# Patient Record
Sex: MALE | Race: WHITE | Employment: OTHER | URBAN - METROPOLITAN AREA
[De-identification: names, ages, dates, MRNs, and addresses within clinical notes are randomized per-mention and may not be internally consistent; named-entity substitution may affect disease eponyms.]

---

## 2019-05-13 ENCOUNTER — HOSPITAL ENCOUNTER (INPATIENT)
Age: 64
LOS: 2 days | Discharge: HOME | DRG: 871 | End: 2019-05-15
Attending: EMERGENCY MEDICINE | Admitting: INTERNAL MEDICINE
Payer: MEDICARE

## 2019-05-13 ENCOUNTER — APPOINTMENT (OUTPATIENT)
Dept: CT IMAGING | Age: 64
DRG: 871 | End: 2019-05-13
Payer: MEDICARE

## 2019-05-13 DIAGNOSIS — A41.9 SEPTICEMIA (HCC): Primary | ICD-10-CM

## 2019-05-13 DIAGNOSIS — J18.9 PNEUMONIA DUE TO ORGANISM: ICD-10-CM

## 2019-05-13 LAB
ANION GAP SERPL CALCULATED.3IONS-SCNC: 15 MMOL/L (ref 7–16)
BASOPHILS ABSOLUTE: 0.09 E9/L (ref 0–0.2)
BASOPHILS RELATIVE PERCENT: 0.4 % (ref 0–2)
BUN BLDV-MCNC: 16 MG/DL (ref 8–23)
CALCIUM SERPL-MCNC: 9.1 MG/DL (ref 8.6–10.2)
CHLORIDE BLD-SCNC: 103 MMOL/L (ref 98–107)
CO2: 29 MMOL/L (ref 22–29)
CREAT SERPL-MCNC: 1.4 MG/DL (ref 0.7–1.2)
D DIMER: 1370 NG/ML DDU
EKG ATRIAL RATE: 75 BPM
EKG P AXIS: 69 DEGREES
EKG P-R INTERVAL: 128 MS
EKG Q-T INTERVAL: 418 MS
EKG QRS DURATION: 86 MS
EKG QTC CALCULATION (BAZETT): 466 MS
EKG R AXIS: -28 DEGREES
EKG T AXIS: 63 DEGREES
EKG VENTRICULAR RATE: 75 BPM
EOSINOPHILS ABSOLUTE: 0.18 E9/L (ref 0.05–0.5)
EOSINOPHILS RELATIVE PERCENT: 0.8 % (ref 0–6)
GFR AFRICAN AMERICAN: >60
GFR NON-AFRICAN AMERICAN: 51 ML/MIN/1.73
GLUCOSE BLD-MCNC: 186 MG/DL (ref 74–99)
HCT VFR BLD CALC: 56.5 % (ref 37–54)
HEMOGLOBIN: 19.1 G/DL (ref 12.5–16.5)
IMMATURE GRANULOCYTES #: 0.32 E9/L
IMMATURE GRANULOCYTES %: 1.5 % (ref 0–5)
LACTIC ACID: 2 MMOL/L (ref 0.5–2.2)
LACTIC ACID: 2.3 MMOL/L (ref 0.5–2.2)
LYMPHOCYTES ABSOLUTE: 1.89 E9/L (ref 1.5–4)
LYMPHOCYTES RELATIVE PERCENT: 8.9 % (ref 20–42)
MCH RBC QN AUTO: 31.9 PG (ref 26–35)
MCHC RBC AUTO-ENTMCNC: 33.8 % (ref 32–34.5)
MCV RBC AUTO: 94.3 FL (ref 80–99.9)
MONOCYTES ABSOLUTE: 1.3 E9/L (ref 0.1–0.95)
MONOCYTES RELATIVE PERCENT: 6.1 % (ref 2–12)
NEUTROPHILS ABSOLUTE: 17.44 E9/L (ref 1.8–7.3)
NEUTROPHILS RELATIVE PERCENT: 82.3 % (ref 43–80)
PDW BLD-RTO: 13.2 FL (ref 11.5–15)
PLATELET # BLD: 346 E9/L (ref 130–450)
PMV BLD AUTO: 8.8 FL (ref 7–12)
POTASSIUM REFLEX MAGNESIUM: 4.2 MMOL/L (ref 3.5–5)
PRO-BNP: 34 PG/ML (ref 0–125)
RBC # BLD: 5.99 E12/L (ref 3.8–5.8)
SODIUM BLD-SCNC: 147 MMOL/L (ref 132–146)
WBC # BLD: 21.2 E9/L (ref 4.5–11.5)

## 2019-05-13 PROCEDURE — 85025 COMPLETE CBC W/AUTO DIFF WBC: CPT

## 2019-05-13 PROCEDURE — 94640 AIRWAY INHALATION TREATMENT: CPT

## 2019-05-13 PROCEDURE — 87040 BLOOD CULTURE FOR BACTERIA: CPT

## 2019-05-13 PROCEDURE — 96374 THER/PROPH/DIAG INJ IV PUSH: CPT

## 2019-05-13 PROCEDURE — 2580000003 HC RX 258: Performed by: EMERGENCY MEDICINE

## 2019-05-13 PROCEDURE — 96361 HYDRATE IV INFUSION ADD-ON: CPT

## 2019-05-13 PROCEDURE — 96365 THER/PROPH/DIAG IV INF INIT: CPT

## 2019-05-13 PROCEDURE — 96375 TX/PRO/DX INJ NEW DRUG ADDON: CPT

## 2019-05-13 PROCEDURE — 1200000000 HC SEMI PRIVATE

## 2019-05-13 PROCEDURE — 80048 BASIC METABOLIC PNL TOTAL CA: CPT

## 2019-05-13 PROCEDURE — 99285 EMERGENCY DEPT VISIT HI MDM: CPT

## 2019-05-13 PROCEDURE — 83880 ASSAY OF NATRIURETIC PEPTIDE: CPT

## 2019-05-13 PROCEDURE — 83605 ASSAY OF LACTIC ACID: CPT

## 2019-05-13 PROCEDURE — 6360000002 HC RX W HCPCS: Performed by: NURSE PRACTITIONER

## 2019-05-13 PROCEDURE — 6360000004 HC RX CONTRAST MEDICATION: Performed by: RADIOLOGY

## 2019-05-13 PROCEDURE — 71275 CT ANGIOGRAPHY CHEST: CPT

## 2019-05-13 PROCEDURE — 2580000003 HC RX 258: Performed by: STUDENT IN AN ORGANIZED HEALTH CARE EDUCATION/TRAINING PROGRAM

## 2019-05-13 PROCEDURE — 2580000003 HC RX 258: Performed by: NURSE PRACTITIONER

## 2019-05-13 PROCEDURE — 93005 ELECTROCARDIOGRAM TRACING: CPT | Performed by: STUDENT IN AN ORGANIZED HEALTH CARE EDUCATION/TRAINING PROGRAM

## 2019-05-13 PROCEDURE — 85378 FIBRIN DEGRADE SEMIQUANT: CPT

## 2019-05-13 PROCEDURE — 93010 ELECTROCARDIOGRAM REPORT: CPT | Performed by: INTERNAL MEDICINE

## 2019-05-13 PROCEDURE — 99220 PR INITIAL OBSERVATION CARE/DAY 70 MINUTES: CPT | Performed by: NURSE PRACTITIONER

## 2019-05-13 PROCEDURE — 36415 COLL VENOUS BLD VENIPUNCTURE: CPT

## 2019-05-13 PROCEDURE — 6360000002 HC RX W HCPCS: Performed by: EMERGENCY MEDICINE

## 2019-05-13 PROCEDURE — 6370000000 HC RX 637 (ALT 250 FOR IP): Performed by: EMERGENCY MEDICINE

## 2019-05-13 RX ORDER — IPRATROPIUM BROMIDE AND ALBUTEROL SULFATE 2.5; .5 MG/3ML; MG/3ML
1 SOLUTION RESPIRATORY (INHALATION) ONCE
Status: COMPLETED | OUTPATIENT
Start: 2019-05-13 | End: 2019-05-13

## 2019-05-13 RX ORDER — 0.9 % SODIUM CHLORIDE 0.9 %
1000 INTRAVENOUS SOLUTION INTRAVENOUS ONCE
Status: COMPLETED | OUTPATIENT
Start: 2019-05-13 | End: 2019-05-13

## 2019-05-13 RX ORDER — 0.9 % SODIUM CHLORIDE 0.9 %
500 INTRAVENOUS SOLUTION INTRAVENOUS ONCE
Status: DISCONTINUED | OUTPATIENT
Start: 2019-05-13 | End: 2019-05-13

## 2019-05-13 RX ORDER — ONDANSETRON 2 MG/ML
4 INJECTION INTRAMUSCULAR; INTRAVENOUS EVERY 6 HOURS PRN
Status: DISCONTINUED | OUTPATIENT
Start: 2019-05-13 | End: 2019-05-15 | Stop reason: HOSPADM

## 2019-05-13 RX ORDER — 0.9 % SODIUM CHLORIDE 0.9 %
30 INTRAVENOUS SOLUTION INTRAVENOUS ONCE
Status: COMPLETED | OUTPATIENT
Start: 2019-05-13 | End: 2019-05-13

## 2019-05-13 RX ORDER — SODIUM CHLORIDE 9 MG/ML
INJECTION, SOLUTION INTRAVENOUS CONTINUOUS
Status: DISCONTINUED | OUTPATIENT
Start: 2019-05-13 | End: 2019-05-15 | Stop reason: HOSPADM

## 2019-05-13 RX ORDER — SODIUM CHLORIDE 0.9 % (FLUSH) 0.9 %
10 SYRINGE (ML) INJECTION EVERY 12 HOURS SCHEDULED
Status: DISCONTINUED | OUTPATIENT
Start: 2019-05-13 | End: 2019-05-15 | Stop reason: HOSPADM

## 2019-05-13 RX ORDER — SODIUM CHLORIDE 0.9 % (FLUSH) 0.9 %
10 SYRINGE (ML) INJECTION PRN
Status: DISCONTINUED | OUTPATIENT
Start: 2019-05-13 | End: 2019-05-15 | Stop reason: HOSPADM

## 2019-05-13 RX ORDER — ACETAMINOPHEN 325 MG/1
650 TABLET ORAL EVERY 4 HOURS PRN
Status: DISCONTINUED | OUTPATIENT
Start: 2019-05-13 | End: 2019-05-15 | Stop reason: HOSPADM

## 2019-05-13 RX ORDER — KETOROLAC TROMETHAMINE 15 MG/ML
15 INJECTION, SOLUTION INTRAMUSCULAR; INTRAVENOUS ONCE
Status: COMPLETED | OUTPATIENT
Start: 2019-05-14 | End: 2019-05-13

## 2019-05-13 RX ADMIN — IOPAMIDOL 80 ML: 755 INJECTION, SOLUTION INTRAVENOUS at 18:56

## 2019-05-13 RX ADMIN — Medication 10 ML: at 23:58

## 2019-05-13 RX ADMIN — SODIUM CHLORIDE 2244 ML: 9 INJECTION, SOLUTION INTRAVENOUS at 20:41

## 2019-05-13 RX ADMIN — IPRATROPIUM BROMIDE AND ALBUTEROL SULFATE 1 AMPULE: .5; 3 SOLUTION RESPIRATORY (INHALATION) at 20:33

## 2019-05-13 RX ADMIN — SODIUM CHLORIDE: 9 INJECTION, SOLUTION INTRAVENOUS at 23:57

## 2019-05-13 RX ADMIN — KETOROLAC TROMETHAMINE 15 MG: 15 INJECTION, SOLUTION INTRAMUSCULAR; INTRAVENOUS at 23:57

## 2019-05-13 RX ADMIN — SODIUM CHLORIDE 1000 ML: 9 INJECTION, SOLUTION INTRAVENOUS at 17:11

## 2019-05-13 RX ADMIN — WATER 2 G: 1 INJECTION INTRAMUSCULAR; INTRAVENOUS; SUBCUTANEOUS at 20:41

## 2019-05-13 RX ADMIN — AZITHROMYCIN DIHYDRATE 500 MG: 500 INJECTION, POWDER, LYOPHILIZED, FOR SOLUTION INTRAVENOUS at 20:47

## 2019-05-13 ASSESSMENT — ENCOUNTER SYMPTOMS
EYE REDNESS: 0
SHORTNESS OF BREATH: 1
VOMITING: 0
EYE DISCHARGE: 0
COUGH: 1
WHEEZING: 0
SINUS PRESSURE: 0
EYE PAIN: 0
SPUTUM PRODUCTION: 1
DIARRHEA: 0
BACK PAIN: 0
ABDOMINAL PAIN: 0
NAUSEA: 0
SORE THROAT: 1

## 2019-05-13 ASSESSMENT — PAIN SCALES - GENERAL
PAINLEVEL_OUTOF10: 4
PAINLEVEL_OUTOF10: 3

## 2019-05-13 ASSESSMENT — PAIN DESCRIPTION - LOCATION: LOCATION: BACK

## 2019-05-13 ASSESSMENT — PAIN DESCRIPTION - FREQUENCY: FREQUENCY: CONTINUOUS

## 2019-05-13 ASSESSMENT — PAIN DESCRIPTION - PROGRESSION: CLINICAL_PROGRESSION: NOT CHANGED

## 2019-05-13 ASSESSMENT — PAIN DESCRIPTION - PAIN TYPE: TYPE: ACUTE PAIN

## 2019-05-13 ASSESSMENT — PAIN - FUNCTIONAL ASSESSMENT: PAIN_FUNCTIONAL_ASSESSMENT: PREVENTS OR INTERFERES SOME ACTIVE ACTIVITIES AND ADLS

## 2019-05-13 ASSESSMENT — PAIN DESCRIPTION - DESCRIPTORS: DESCRIPTORS: ACHING;DISCOMFORT;SHARP

## 2019-05-13 ASSESSMENT — PAIN DESCRIPTION - ONSET: ONSET: ON-GOING

## 2019-05-14 PROBLEM — D72.829 LEUKOCYTOSIS: Status: ACTIVE | Noted: 2019-05-14

## 2019-05-14 PROBLEM — N17.9 ACUTE KIDNEY INJURY (HCC): Status: ACTIVE | Noted: 2019-05-14

## 2019-05-14 PROBLEM — J18.9 RIGHT LOWER LOBE PNEUMONIA: Status: ACTIVE | Noted: 2019-05-14

## 2019-05-14 LAB
ANION GAP SERPL CALCULATED.3IONS-SCNC: 10 MMOL/L (ref 7–16)
BASOPHILS ABSOLUTE: 0.07 E9/L (ref 0–0.2)
BASOPHILS RELATIVE PERCENT: 0.4 % (ref 0–2)
BUN BLDV-MCNC: 17 MG/DL (ref 8–23)
CALCIUM SERPL-MCNC: 7.8 MG/DL (ref 8.6–10.2)
CHLORIDE BLD-SCNC: 108 MMOL/L (ref 98–107)
CO2: 24 MMOL/L (ref 22–29)
CREAT SERPL-MCNC: 1.1 MG/DL (ref 0.7–1.2)
EOSINOPHILS ABSOLUTE: 0.4 E9/L (ref 0.05–0.5)
EOSINOPHILS RELATIVE PERCENT: 2.5 % (ref 0–6)
GFR AFRICAN AMERICAN: >60
GFR NON-AFRICAN AMERICAN: >60 ML/MIN/1.73
GLUCOSE BLD-MCNC: 118 MG/DL (ref 74–99)
HCT VFR BLD CALC: 40.2 % (ref 37–54)
HEMOGLOBIN: 13.5 G/DL (ref 12.5–16.5)
IMMATURE GRANULOCYTES #: 0.1 E9/L
IMMATURE GRANULOCYTES %: 0.6 % (ref 0–5)
LYMPHOCYTES ABSOLUTE: 2.23 E9/L (ref 1.5–4)
LYMPHOCYTES RELATIVE PERCENT: 13.9 % (ref 20–42)
MCH RBC QN AUTO: 31.9 PG (ref 26–35)
MCHC RBC AUTO-ENTMCNC: 33.6 % (ref 32–34.5)
MCV RBC AUTO: 95 FL (ref 80–99.9)
MONOCYTES ABSOLUTE: 0.86 E9/L (ref 0.1–0.95)
MONOCYTES RELATIVE PERCENT: 5.4 % (ref 2–12)
NEUTROPHILS ABSOLUTE: 12.37 E9/L (ref 1.8–7.3)
NEUTROPHILS RELATIVE PERCENT: 77.2 % (ref 43–80)
PDW BLD-RTO: 13.2 FL (ref 11.5–15)
PLATELET # BLD: 238 E9/L (ref 130–450)
PMV BLD AUTO: 9.1 FL (ref 7–12)
POTASSIUM REFLEX MAGNESIUM: 4 MMOL/L (ref 3.5–5)
RBC # BLD: 4.23 E12/L (ref 3.8–5.8)
SODIUM BLD-SCNC: 142 MMOL/L (ref 132–146)
WBC # BLD: 16 E9/L (ref 4.5–11.5)

## 2019-05-14 PROCEDURE — 96376 TX/PRO/DX INJ SAME DRUG ADON: CPT

## 2019-05-14 PROCEDURE — 85025 COMPLETE CBC W/AUTO DIFF WBC: CPT

## 2019-05-14 PROCEDURE — 2580000003 HC RX 258: Performed by: NURSE PRACTITIONER

## 2019-05-14 PROCEDURE — 36415 COLL VENOUS BLD VENIPUNCTURE: CPT

## 2019-05-14 PROCEDURE — 96366 THER/PROPH/DIAG IV INF ADDON: CPT

## 2019-05-14 PROCEDURE — 99223 1ST HOSP IP/OBS HIGH 75: CPT | Performed by: INTERNAL MEDICINE

## 2019-05-14 PROCEDURE — 1200000000 HC SEMI PRIVATE

## 2019-05-14 PROCEDURE — 6370000000 HC RX 637 (ALT 250 FOR IP): Performed by: INTERNAL MEDICINE

## 2019-05-14 PROCEDURE — 80048 BASIC METABOLIC PNL TOTAL CA: CPT

## 2019-05-14 PROCEDURE — 6360000002 HC RX W HCPCS: Performed by: NURSE PRACTITIONER

## 2019-05-14 PROCEDURE — APPSS30 APP SPLIT SHARED TIME 16-30 MINUTES: Performed by: NURSE PRACTITIONER

## 2019-05-14 RX ORDER — GUAIFENESIN 400 MG/1
400 TABLET ORAL 4 TIMES DAILY PRN
Status: DISCONTINUED | OUTPATIENT
Start: 2019-05-14 | End: 2019-05-15 | Stop reason: HOSPADM

## 2019-05-14 RX ORDER — LEVOFLOXACIN 500 MG/1
500 TABLET, FILM COATED ORAL DAILY
Qty: 7 TABLET | Refills: 0 | Status: SHIPPED | OUTPATIENT
Start: 2019-05-14 | End: 2019-05-21

## 2019-05-14 RX ORDER — LORAZEPAM 0.5 MG/1
0.5 TABLET ORAL EVERY 4 HOURS PRN
Status: DISCONTINUED | OUTPATIENT
Start: 2019-05-14 | End: 2019-05-15 | Stop reason: HOSPADM

## 2019-05-14 RX ORDER — ALBUTEROL SULFATE 90 UG/1
2 AEROSOL, METERED RESPIRATORY (INHALATION) EVERY 4 HOURS PRN
Qty: 1 INHALER | Refills: 0 | Status: SHIPPED | OUTPATIENT
Start: 2019-05-14 | End: 2019-06-13

## 2019-05-14 RX ORDER — GUAIFENESIN 400 MG/1
400 TABLET ORAL 4 TIMES DAILY PRN
Qty: 20 TABLET | Refills: 0 | Status: SHIPPED | OUTPATIENT
Start: 2019-05-14 | End: 2019-05-19

## 2019-05-14 RX ADMIN — LORAZEPAM 0.5 MG: 0.5 TABLET ORAL at 12:36

## 2019-05-14 RX ADMIN — WATER 1 G: 1 INJECTION INTRAMUSCULAR; INTRAVENOUS; SUBCUTANEOUS at 20:15

## 2019-05-14 RX ADMIN — AZITHROMYCIN DIHYDRATE 250 MG: 500 INJECTION, POWDER, LYOPHILIZED, FOR SOLUTION INTRAVENOUS at 20:15

## 2019-05-14 ASSESSMENT — PAIN SCALES - GENERAL: PAINLEVEL_OUTOF10: 0

## 2019-05-14 NOTE — PROGRESS NOTES
[address] [place] [address] Hospitalist   Progress Note    Admitting Date and Time: 5/13/2019  4:07 PM  Admit Dx: Sepsis ([facility] [address] [A41.9]    Subjective:    Seen and examined  Is very anxious to be able to return home to provide care for his special needs child. Wanted to leave today  Agrees to remain over night, but insists he needs to leave tomorrow  Denies fever, chills, CP, SOB      ROS: denies fever, chills, cp, sob, n/v, HA unless stated above.      sodium chloride flush  10 mL Intravenous 2 times per day    enoxaparin  40 mg Subcutaneous Daily    azithromycin  250 mg Intravenous Q24H    cefTRIAXone (ROCEPHIN) IV  1 g Intravenous Q24H       guaiFENesin 400 mg 4x Daily PRN   sodium chloride flush 10 mL PRN   magnesium hydroxide 30 mL Daily PRN   ondansetron 4 mg Q6H PRN   acetaminophen 650 mg Q4H PRN        Objective:    /72   Pulse 106   Temp 98.5 °F (36.9 °C) (Oral)   Resp 18   Ht 5' 9\" (1.753 m)   Wt 165 lb (74.8 kg)   SpO2 94%   BMI 24.37 kg/m²      General Appearance: alert and oriented to person, place and time and in no acute distress  Skin: warm and dry  Head: normocephalic and atraumatic  Eyes: pupils equal, round, and reactive to light, extraocular eye movements intact, conjunctivae normal  Neck: neck supple and non tender without mass   Pulmonary/Chest: clear to auscultation bilaterally- no wheezes, rales or rhonchi, normal air movement, no respiratory distress  Cardiovascular: normal rate, normal S1 and S2 and no carotid bruits  Abdomen: soft, non-tender, non-distended, normal bowel sounds, no masses or organomegaly  Extremities: no cyanosis, no clubbing and no edema  Neurologic: no cranial nerve deficit and speech normal      Recent Labs     05/13/19 1710 05/14/19  0518   * 142   K 4.2 4.0    108*   CO2 29 24   BUN 16 17   CREATININE 1.4* 1.1   GLUCOSE 186* 118*   CALCIUM 9.1 7.8*       Recent Labs     05/13/19 1710 05/14/19  0518   WBC 21.2* 16.0*   RBC 5.99* 4.23   HGB 19.1* 13.5   HCT 56.5* 40.2   MCV 94.3 95.0   MCH 31.9 31.9   MCHC 33.8 33.6   RDW 13.2 13.2    238   MPV 8.8 9.1          Radiology:   CTA PULMONARY W CONTRAST   Final Result   1. There is no evidence of a pulmonary embolus. 2. Right lower lobe pneumonia with reactive lymphadenopathy seen   within the right hilum, infrahilar region and subcarinal region. Assessment:  Principal Problem:    Sepsis ([address]  Active Problems:    Right lower lobe pneumonia (HCC)    Leukocytosis    Acute kidney injury ([address]  Resolved Problems:    * No resolved hospital problems. *      Plan:  1. Acute sepsis secondary to PNA:  WBC ( 21.2)-16.0, lactic acidosis ( 2.3)-2.0. Afebrile. IVF hydration . Follow blood cultures. CBC in am. Cycle Lactic acid. Continue azithromycin 250mg daily, rocephin 1gm daily. 2. Right lower lobe pneumonia- Antibiotic treatment as above. Encourage cough and deep breath. Respiratory evaluation and treatment. Sputum culture. Guaifenesin 400mg 4xdaily PRN. IVF hydration. 3. DIMITRIOS- now resolved.  BUN/Crt 16/1.4.        Electronically signed by СЕРГЕЙ Cox CNP on 5/14/2019 at 10:03 AM

## 2019-05-14 NOTE — PLAN OF CARE
Problem: Pain:  Goal: Control of acute pain  Description  Control of acute pain  Outcome: Met This Shift     Problem: Pain:  Goal: Ability to tolerate increased activity will improve  Description  Ability to tolerate increased activity will improve     2019 1416 by Ana Luisa Green RN  Outcome: Met This Shift     Problem: Airway Clearance - Ineffective:  Goal: Ability to tolerate increased activity will improve  Description  Ability to tolerate increased activity will improve     2019 1416 by Ana Luisa Green RN  Outcome: Met This Shift     Problem: Airway Clearance - Ineffective:  Goal: Ability to maintain a clear airway will improve  Description  Ability to maintain a clear airway will improve  Outcome: Met This Shift     Problem: Discharge Planning:  Goal: Participates in care planning  Description  Participates in care planning  Outcome: Met This Shift     Problem: Fluid Volume - Deficit:  Goal: Achieves intake and output within specified parameters  Description  Achieves intake and output within specified parameters  Outcome: Met This Shift     Problem: Gas Exchange - Impaired:  Goal: Levels of oxygenation will improve  Description  Levels of oxygenation will improve  Outcome: Met This Shift     Problem: Hyperthermia:  Goal: Ability to maintain a body temperature in the normal range will improve  Description  Ability to maintain a body temperature in the normal range will improve  Outcome: Met This Shift

## 2019-05-14 NOTE — H&P
[address] [place], [place] [address]ist Group   History and Physical      CHIEF COMPLAINT:  SOB, Productive cough, and malaise    History of Present Illness:  70 y.o. male with no known medical history  presents with increased SOB, productive cough, and malaise. Pt complaint is moderate in severity, persistent, and worsened by nothing. Pt states he is visiting from [place] with no specific time frame to return. He developed a cough and scratchy throat prior to his travels some three weeks ago. It has since gotten progressively worse. Pt is concerned with this hospital admission. He has a special needs son that he cares for and cannot be away from him much longer than tonight. Pt denies CP, n/v, diarrhea, and chills. Will admit pt for further evaluation and treatment. Informant(s) for H&P:Patient and EMR    REVIEW OF SYSTEMS:  Admits to SOB, Productive cough, fevers, and malaise. Denies chills, cp, sob, n/v, ha, vision/hearing changes, wt changes, hot/cold flashes, other open skin lesions, diarrhea, constipation, dysuria/hematuria unless noted in HPI. Complete ROS performed with the patient and is otherwise negative. PMH:  Past Medical History:   Diagnosis Date    Acute kidney injury ([facility] [address] 5/14/2019       Surgical History:  Past Surgical History:   Procedure Laterality Date    TONSILLECTOMY         Medications Prior to Admission:    Prior to Admission medications    Not on File       Allergies:    Patient has no known allergies. Social History:    reports that he has quit smoking. His smoking use included cigarettes. He smoked 2.00 packs per day. He has never used smokeless tobacco. He reports that he drank alcohol. He reports that he has current or past drug history. Family History:   family history is not on file.  Reviewed noncontributory     PHYSICAL EXAM:  Vitals:  /72   Pulse 106   Temp 98.5 °F (36.9 °C) (Oral)   Resp 18   Ht 5' 9\" (1.753 m)   Wt 165 lb (74.8 kg)   SpO2 94% thoracic aorta is normal in caliber. There is no pericardial effusion. The airway is widely patent. There is consolidation seen within the right lower lobe consistent with pneumonia. The right upper lobe, right middle lobe and left lung are clear. Shotty lymph nodes are seen within the right hilum and right subcarinal region consistent with reactive lymphadenopathy. Images through the upper abdomen are unremarkable. 1. There is no evidence of a pulmonary embolus. 2. Right lower lobe pneumonia with reactive lymphadenopathy seen within the right hilum, infrahilar region and subcarinal region. EKG: Interpretation per ED physician: No cute changes. Sinus rhythm. ASSESSMENT:      Principal Problem:    Sepsis ([address]  Active Problems:    Right lower lobe pneumonia ([address]    Leukocytosis    Acute kidney injury ([address]  Resolved Problems:    * No resolved hospital problems. *      PLAN:    1. Sepsis- WBC 21.2 LA 2.3. IVF hydration . Follow blood cultures. CBC in am. Cycle Lactic acid. Ceftriaxone 2gm IV x1. Ceftriaxone 1g IV daily. Azithromycin 500mg IV x1. Azithromycin IV 250mg daily. Antipyretics. 2. Right lower lobe pneumonia- Antibiotic treatment as above. Encourage cough and deep breath. Respiratory evaluation and treatment. Sputum culture. Guaifenesin 400mg 4xdaily PRN. IVF hydration. 3. Leukocytosis-WBC 21.2. Antibiotic treatment as above. Follow CBC in am  4. DIMITRIOS- BUN/Crt 16/1.4. BMP in am. Avoid nephrotoxic medication. IVF hydration. Monitor UO. Code Status: FULL  DVT prophylaxis: Lovenox    NOTE: This report was transcribed using voice recognition software. Every effort was made to ensure accuracy; however, inadvertent computerized transcription errors may be present.     Electronically signed by Jamie Christian CNP on 5/14/2019 at 6:01 AM

## 2019-05-14 NOTE — ED PROVIDER NOTES
Patient is a 70 yr old male that is presenting with shortness of breath and productive cough that has been worsening for the last 3 weeks. He is originally from [place] and drove up here for temporary visitation. He said he first started developing symptoms on the way up and it started as a dry cough but has since progressed. He denies any chest pain, abdominal pain or fevers but he has had intermittent episodes of chills. He denies any history of PE/DVT and is not currently on any blood thinners. The history is provided by the patient. Shortness of Breath   Severity:  Moderate  Onset quality:  Sudden  Duration:  3 weeks  Timing:  Constant  Progression:  Worsening  Chronicity:  New  Associated symptoms: cough, sore throat and sputum production    Associated symptoms: no abdominal pain, no chest pain, no diaphoresis, no ear pain, no fever, no headaches, no rash, no syncope, no vomiting and no wheezing    Risk factors: no family hx of DVT, no hx of cancer and no hx of PE/DVT        Review of Systems   Constitutional: Negative for chills, diaphoresis and fever. HENT: Positive for sore throat. Negative for ear pain and sinus pressure. Eyes: Negative for pain, discharge and redness. Respiratory: Positive for cough, sputum production and shortness of breath. Negative for wheezing. Cardiovascular: Negative for chest pain and syncope. Gastrointestinal: Negative for abdominal pain, diarrhea, nausea and vomiting. Genitourinary: Negative for dysuria and frequency. Musculoskeletal: Negative for arthralgias and back pain. Skin: Negative for rash and wound. Neurological: Negative for weakness and headaches. Hematological: Negative for adenopathy. All other systems reviewed and are negative. Physical Exam   Constitutional: He is oriented to person, place, and time. He appears well-developed and well-nourished. Patient \"looks\" ill on presentation   HENT:   Head: Normocephalic and atraumatic. pneumonia - no signs of PE.     [AL]   2018 Patient was agreeable to be admitted for treatment. He will be given antibiotics here and another bolus of fluids. [AL]      ED Course User Index  [AL] Irwin Santo DO     Time: 1700  Re-evaluation. Patients symptoms are improving  Repeat physical examination is improved     Sepsis Re-examination  5/13/19   1900 PM          Vital Signs:   Vitals:    05/13/19 1729 05/13/19 1949 05/13/19 2202 05/13/19 2324   BP: (!) 102/55 133/74 106/70 103/72   Pulse: 100 98 103 106   Resp: 18 19 18 18   Temp:   99.1 °F (37.3 °C) 98.5 °F (36.9 °C)   TempSrc:   Oral Oral   SpO2: 100% 92% 96% 94%   Weight:    165 lb (74.8 kg)   Height:    5' 9\" (1.753 m)     Card/Pulm:  Rhythm: normal rate. Heart Sounds: Normal S1, S2. wheezing. Capillary Refill: normal.  Radial Pulse:  present 2+. Skin:  Warm.    --------------------------------------------- PAST HISTORY ---------------------------------------------  Past Medical History:  has no past medical history on file. Past Surgical History:  has a past surgical history that includes Tonsillectomy. Social History:  reports that he has quit smoking. His smoking use included cigarettes. He smoked 2.00 packs per day. He has never used smokeless tobacco. He reports that he drank alcohol. He reports that he has current or past drug history. Family History: family history is not on file. The patients home medications have been reviewed. Allergies: Patient has no known allergies.     -------------------------------------------------- RESULTS -------------------------------------------------    LABS:  Results for orders placed or performed during the hospital encounter of 05/13/19   CBC Auto Differential   Result Value Ref Range    WBC 21.2 (H) 4.5 - 11.5 E9/L    RBC 5.99 (H) 3.80 - 5.80 E12/L    Hemoglobin 19.1 (H) 12.5 - 16.5 g/dL    Hematocrit 56.5 (H) 37.0 - 54.0 %    MCV 94.3 80.0 - 99.9 fL    MCH 31.9 26.0 - 35.0 pg    MCHC 33.8 32.0 - 34.5 %    RDW 13.2 11.5 - 15.0 fL    Platelets 399 728 - 091 E9/L    MPV 8.8 7.0 - 12.0 fL    Neutrophils % 82.3 (H) 43.0 - 80.0 %    Immature Granulocytes % 1.5 0.0 - 5.0 %    Lymphocytes % 8.9 (L) 20.0 - 42.0 %    Monocytes % 6.1 2.0 - 12.0 %    Eosinophils % 0.8 0.0 - 6.0 %    Basophils % 0.4 0.0 - 2.0 %    Neutrophils # 17.44 (H) 1.80 - 7.30 E9/L    Immature Granulocytes # 0.32 E9/L    Lymphocytes # 1.89 1.50 - 4.00 E9/L    Monocytes # 1.30 (H) 0.10 - 0.95 E9/L    Eosinophils # 0.18 0.05 - 0.50 E9/L    Basophils # 0.09 0.00 - 0.20 U7/L   Basic Metabolic Panel w/ Reflex to MG   Result Value Ref Range    Sodium 147 (H) 132 - 146 mmol/L    Potassium reflex Magnesium 4.2 3.5 - 5.0 mmol/L    Chloride 103 98 - 107 mmol/L    CO2 29 22 - 29 mmol/L    Anion Gap 15 7 - 16 mmol/L    Glucose 186 (H) 74 - 99 mg/dL    BUN 16 8 - 23 mg/dL    CREATININE 1.4 (H) 0.7 - 1.2 mg/dL    GFR Non-African American 51 >=60 mL/min/1.73    GFR African American >60     Calcium 9.1 8.6 - 10.2 mg/dL   D-Dimer, Quantitative   Result Value Ref Range    D-Dimer, Quant 1370 ng/mL DDU   Lactic Acid, Plasma   Result Value Ref Range    Lactic Acid 2.3 (H) 0.5 - 2.2 mmol/L   Lactic Acid, Plasma   Result Value Ref Range    Lactic Acid 2.0 0.5 - 2.2 mmol/L   Brain Natriuretic Peptide   Result Value Ref Range    Pro-BNP 34 0 - 125 pg/mL   EKG 12 Lead   Result Value Ref Range    Ventricular Rate 75 BPM    Atrial Rate 75 BPM    P-R Interval 128 ms    QRS Duration 86 ms    Q-T Interval 418 ms    QTc Calculation (Bazett) 466 ms    P Axis 69 degrees    R Axis -28 degrees    T Axis 63 degrees       RADIOLOGY:  CTA PULMONARY W CONTRAST   Final Result   1. There is no evidence of a pulmonary embolus. 2. Right lower lobe pneumonia with reactive lymphadenopathy seen   within the right hilum, infrahilar region and subcarinal region. EKG: This EKG is signed and interpreted by me.     Rate: 75  Rhythm: Sinus  Interpretation: no acute case, including pertinent history (medical, surgical, family and social) and exam findings with the Resident and the Nurse assigned to Serjio Floyd. I have personally performed and/or participated in the history, exam, medical decision making, and procedures and agree with all pertinent clinical information. I have reviewed my findings and recommendations with Serjio Floyd and members of family present at the time of disposition. MDM: Evidence of any heart pneumonia, due to recent travel d-dimer was ordered which elevated last CT was ordered. No pulmonary embolism but pneumonia was identified, leukocytosis, spoke with medicine they will admit the patient    CRITICAL CARE:   30 MINUTES. Please note that the withdrawal or failure to initiate urgent interventions for this patient would likely result in a life threatening deterioration or permanent disability. Accordingly this patient received the above mentioned time, excluding separately billable procedures. My findings/plan: The primary encounter diagnosis was Septicemia ([address] A diagnosis of Pneumonia due to organism was also pertinent to this visit. There are no discharge medications for this patient.     DO Jay Jay Brandon DO  05/13/19 8805

## 2019-05-14 NOTE — PLAN OF CARE
Problem: Pain:  Goal: Pain level will decrease  Description  Pain level will decrease  Outcome: Met This Shift     Problem: Pain:  Goal: Ability to tolerate increased activity will improve  Description  Ability to tolerate increased activity will improve     Outcome: Met This Shift     Problem: Airway Clearance - Ineffective:  Goal: Ability to tolerate increased activity will improve  Description  Ability to tolerate increased activity will improve     Outcome: Met This Shift

## 2019-05-14 NOTE — PLAN OF CARE
Problem: Pain:  Goal: Pain level will decrease  Description  Pain level will decrease  Outcome: Met This Shift     Problem: Pain:  Goal: Control of acute pain  Description  Control of acute pain  5/14/2019 1846 by Robby Dowling RN  Outcome: Met This Shift     Problem: Airway Clearance - Ineffective:  Goal: Ability to tolerate increased activity will improve  Description  Ability to tolerate increased activity will improve     5/14/2019 1846 by Robby Dowling RN  Outcome: Met This Shift     Problem: Fluid Volume - Deficit:  Goal: Achieves intake and output within specified parameters  Description  Achieves intake and output within specified parameters  5/14/2019 1846 by Robby Dowling RN  Outcome: Met This Shift     Problem: Gas Exchange - Impaired:  Goal: Levels of oxygenation will improve  Description  Levels of oxygenation will improve  5/14/2019 1846 by Robby Dowling RN  Outcome: Met This Shift     Problem: Hyperthermia:  Goal: Ability to maintain a body temperature in the normal range will improve  Description  Ability to maintain a body temperature in the normal range will improve  5/14/2019 1846 by Robby Dowling RN  Outcome: Met This Shift

## 2019-05-14 NOTE — ED NOTES
Called report to 3rd floor, stated that room is not ready and will call when clean     Christie Marinelli, RN  05/13/19 0604

## 2019-05-15 VITALS
RESPIRATION RATE: 16 BRPM | DIASTOLIC BLOOD PRESSURE: 73 MMHG | TEMPERATURE: 98.3 F | WEIGHT: 165 LBS | HEIGHT: 69 IN | BODY MASS INDEX: 24.44 KG/M2 | HEART RATE: 81 BPM | SYSTOLIC BLOOD PRESSURE: 136 MMHG | OXYGEN SATURATION: 95 %

## 2019-05-15 LAB
ANION GAP SERPL CALCULATED.3IONS-SCNC: 10 MMOL/L (ref 7–16)
BUN BLDV-MCNC: 11 MG/DL (ref 8–23)
CALCIUM SERPL-MCNC: 8.9 MG/DL (ref 8.6–10.2)
CHLORIDE BLD-SCNC: 107 MMOL/L (ref 98–107)
CO2: 27 MMOL/L (ref 22–29)
CREAT SERPL-MCNC: 0.9 MG/DL (ref 0.7–1.2)
GFR AFRICAN AMERICAN: >60
GFR NON-AFRICAN AMERICAN: >60 ML/MIN/1.73
GLUCOSE BLD-MCNC: 115 MG/DL (ref 74–99)
HCT VFR BLD CALC: 41.5 % (ref 37–54)
HEMOGLOBIN: 13.9 G/DL (ref 12.5–16.5)
MAGNESIUM: 1.9 MG/DL (ref 1.6–2.6)
MCH RBC QN AUTO: 31.6 PG (ref 26–35)
MCHC RBC AUTO-ENTMCNC: 33.5 % (ref 32–34.5)
MCV RBC AUTO: 94.3 FL (ref 80–99.9)
PDW BLD-RTO: 12.9 FL (ref 11.5–15)
PLATELET # BLD: 204 E9/L (ref 130–450)
PMV BLD AUTO: 9.1 FL (ref 7–12)
POTASSIUM SERPL-SCNC: 4.1 MMOL/L (ref 3.5–5)
RBC # BLD: 4.4 E12/L (ref 3.8–5.8)
SODIUM BLD-SCNC: 144 MMOL/L (ref 132–146)
WBC # BLD: 10.4 E9/L (ref 4.5–11.5)

## 2019-05-15 PROCEDURE — 99239 HOSP IP/OBS DSCHRG MGMT >30: CPT | Performed by: INTERNAL MEDICINE

## 2019-05-15 PROCEDURE — APPSS30 APP SPLIT SHARED TIME 16-30 MINUTES: Performed by: NURSE PRACTITIONER

## 2019-05-15 PROCEDURE — 80048 BASIC METABOLIC PNL TOTAL CA: CPT

## 2019-05-15 PROCEDURE — 85027 COMPLETE CBC AUTOMATED: CPT

## 2019-05-15 PROCEDURE — 36415 COLL VENOUS BLD VENIPUNCTURE: CPT

## 2019-05-15 PROCEDURE — 83735 ASSAY OF MAGNESIUM: CPT

## 2019-05-15 PROCEDURE — 2580000003 HC RX 258: Performed by: NURSE PRACTITIONER

## 2019-05-15 RX ADMIN — SODIUM CHLORIDE: 9 INJECTION, SOLUTION INTRAVENOUS at 08:42

## 2019-05-15 ASSESSMENT — PAIN SCALES - GENERAL: PAINLEVEL_OUTOF10: 0

## 2019-05-15 NOTE — PLAN OF CARE
Problem: Pain:  Goal: Control of acute pain  Description  Control of acute pain  5/15/2019 1401 by Lori Evans RN  Outcome: Met This Shift     Problem: Pain:  Goal: Ability to tolerate increased activity will improve  Description  Ability to tolerate increased activity will improve     5/15/2019 1401 by Lori Evans RN  Outcome: Met This Shift     Problem: Airway Clearance - Ineffective:  Goal: Ability to tolerate increased activity will improve  Description  Ability to tolerate increased activity will improve     5/15/2019 1401 by Lori Evans RN  Outcome: Met This Shift     Problem: Airway Clearance - Ineffective:  Goal: Clear lung sounds  Description  Clear lung sounds  5/15/2019 1401 by Lori Evans RN  Outcome: Met This Shift     Problem: Airway Clearance - Ineffective:  Goal: Ability to maintain a clear airway will improve  Description  Ability to maintain a clear airway will improve  5/15/2019 1401 by Lori Evans RN  Outcome: Met This Shift     Problem: Discharge Planning:  Goal: Discharged to appropriate level of care  Description  Discharged to appropriate level of care  5/15/2019 1401 by Lori Evans RN  Outcome: Met This Shift     Problem: Discharge Planning:  Goal: Participates in care planning  Description  Participates in care planning  5/15/2019 1401 by Lori Evans RN  Outcome: Met This Shift     Problem: Fluid Volume - Deficit:  Goal: Achieves intake and output within specified parameters  Description  Achieves intake and output within specified parameters  5/15/2019 1401 by Lori Evans RN  Outcome: Met This Shift     Problem: Gas Exchange - Impaired:  Goal: Levels of oxygenation will improve  Description  Levels of oxygenation will improve  5/15/2019 1401 by Lori Evans RN  Outcome: Met This Shift     Problem: Hyperthermia:  Goal: Ability to maintain a body temperature in the normal range will improve  Description  Ability to maintain a body temperature in

## 2019-05-15 NOTE — DISCHARGE SUMMARY
[facility] Physician Discharge Summary         Follow up with your local  PCP once you return home      Activity level:  As tolerated    Diet: DIET GENERAL;    Condition at discharge: stable    Dispo: home    Patient ID:  Lindsey David  [id number]  70 y.o.  1955    Admit date: 5/13/2019    Discharge date and time:  5/15/2019  11:14 AM    Admission Diagnoses: Principal Problem:    Sepsis ([address]  Active Problems:    Right lower lobe pneumonia ([address]    Leukocytosis    Acute kidney injury ([address]    Septicemia ([address]  Resolved Problems:    * No resolved hospital problems. *      Discharge Diagnoses: Principal Problem:    Sepsis ([address]  Active Problems:    Right lower lobe pneumonia ([address]    Leukocytosis    Acute kidney injury ([address]    Septicemia ([address]  Resolved Problems:    * No resolved hospital problems. *      Consults:  IP CONSULT TO INTERNAL MEDICINE    Procedures:  none    Hospital Course:     70year old with PMH of thyroid disease, seizure disorder, HTN, HLD, DM2, COPD, and CHF who was admitted with sepsis secondary to  CAP after presenting to  the ER for  SOB, cough, and general malaise. Patient is here from [place] visiting family. He was given dose of ceftriaxone in ER and continu  don same with added azithromycin , duonebs, mucinex, and IV fluids. BMP was monitored for acute kidney injury with elevated creatinine which had resolved by discharge. Patient symptoms have improved and he is anxious to be discharged to care for his special needs child. He will follow up with his PCP when he returns to [place].      Discharge Exam:  Vitals:    05/13/19 2324 05/14/19 0830 05/14/19 1900 05/15/19 0830   BP: 103/72 125/72 139/78 136/73   Pulse: 106 95 92 81   Resp: 18 16 16 16   Temp: 98.5 °F (36.9 °C) 97.6 °F (36.4 °C) 97.4 °F (36.3 °C) 98.3 °F (36.8 °C)   TempSrc: Oral  Oral    SpO2: 94% 95% 94% 95%   Weight: 165 lb (74.8 kg)      Height: 5' 9\" (1.753 m)          General Appearance: alert and oriented to person, place and time and in no acute distress  Skin: warm and dry  Head: normocephalic and atraumatic  Eyes: pupils equal, round, and reactive to light, extraocular eye movements intact, conjunctivae normal  Neck: neck supple and non tender without mass   Pulmonary/Chest:  Crackles right lung base, normal air movement, no respiratory distress  Cardiovascular: normal rate, normal S1 and S2 and no carotid bruits  Abdomen: soft, non-tender, non-distended, normal bowel sounds, no masses or organomegaly  Extremities: no cyanosis, no clubbing and no edema  Neurologic: no cranial nerve deficit and speech normal      I/O last 3 completed shifts: In: 2501.7 [P.O.:760; I.V.:1491.7; IV Piggyback:250]  Out: 2200 [Urine:2200]  I/O this shift:  In: -   Out: 1000 [Urine:1000]      LABS:  Recent Labs     05/13/19  1710 05/14/19  0518 05/15/19  0443   * 142 144   K 4.2 4.0 4.1    108* 107   CO2 29 24 27   BUN 16 17 11   CREATININE 1.4* 1.1 0.9   GLUCOSE 186* 118* 115*   CALCIUM 9.1 7.8* 8.9       Recent Labs     05/13/19  1710 05/14/19  0518 05/15/19  0443   WBC 21.2* 16.0* 10.4   RBC 5.99* 4.23 4.40   HGB 19.1* 13.5 13.9   HCT 56.5* 40.2 41.5   MCV 94.3 95.0 94.3   MCH 31.9 31.9 31.6   MCHC 33.8 33.6 33.5   RDW 13.2 13.2 12.9    238 204   MPV 8.8 9.1 9.1       No results for input(s): POCGLU in the last 72 hours. Imaging:  Cta Pulmonary W Contrast    Result Date: 5/13/2019  Reading location:  [facility] HISTORY: Shortness of breath, wheezing, chest pain. TECHNIQUE: Serial axial images of the thorax were obtained following the uneventful administration of 80 cc of Isovue-370 intravenous contrast. Reformatted sagittal and coronal images were obtained. 3-D MIP and volume-rendered images of the pulmonary arteries were obtained following a pulmonary embolus protocol. One or more of the following dose reduction techniques were used: Automated exposure control.  Adjustment of the mA

## 2019-05-15 NOTE — PLAN OF CARE
Problem: Pain:  Goal: Pain level will decrease  Description  Pain level will decrease  5/15/2019 0405 by Jacob Mora RN  Outcome: Met This Shift  5/15/2019 0404 by Jacob Mora RN  Outcome: Met This Shift  2019 1846 by Anna Roberts RN  Outcome: Met This Shift  Goal: Control of acute pain  Description  Control of acute pain  5/15/2019 0405 by Jacob Mora RN  Outcome: Met This Shift  5/15/2019 0404 by Jacob Mora RN  Outcome: Met This Shift  2019 1846 by Anna Roberts RN  Outcome: Met This Shift  2019 1416 by Ana Luisa Green RN  Outcome: Met This Shift  Goal: Control of chronic pain  Description  Control of chronic pain  5/15/2019 0405 by Jacob Mora RN  Outcome: Met This Shift  5/15/2019 0404 by Jacob Mora RN  Outcome: Met This Shift  Goal: Ability to tolerate increased activity will improve  Description  Ability to tolerate increased activity will improve     5/15/2019 0405 by Jacob Mora RN  Outcome: Met This Shift  5/15/2019 0404 by Jacob Mora RN  Outcome: Met This Shift  2019 1416 by Ana Luisa Green RN  Outcome: Met This Shift     Problem: Airway Clearance - Ineffective:  Goal: Ability to tolerate increased activity will improve  Description  Ability to tolerate increased activity will improve     5/15/2019 0405 by Jacob Mora RN  Outcome: Met This Shift  5/15/2019 0404 by Jacob Mora RN  Outcome: Met This Shift  2019 184 by Anna Roberts RN  Outcome: Met This Shift  2019 1416 by Ana Luisa Green RN  Outcome: Met This Shift  Goal: Clear lung sounds  Description  Clear lung sounds  5/15/2019 0405 by Jacob Mora RN  Outcome: Met This Shift  5/15/2019 0404 by Jacob Mora RN  Outcome: Met This Shift  Goal: Ability to maintain a clear airway will improve  Description  Ability to maintain a clear airway will improve  5/15/2019 0405 by Jacob Mora RN  Outcome: Met This Shift  5/15/2019 0404 by Jacob Mora RN  Outcome: Met This Shift  5/14/2019 1416 by Delano Marcelo RN  Outcome: Met This Shift     Problem: Discharge Planning:  Goal: Discharged to appropriate level of care  Description  Discharged to appropriate level of care  Outcome: Met This Shift  Goal: Participates in care planning  Description  Participates in care planning  5/15/2019 0405 by Janette Coon RN  Outcome: Met This Shift  5/15/2019 0404 by Janette Coon RN  Outcome: Met This Shift  5/14/2019 1416 by Delano Marcelo RN  Outcome: Met This Shift     Problem: Fluid Volume - Deficit:  Goal: Achieves intake and output within specified parameters  Description  Achieves intake and output within specified parameters  5/15/2019 0405 by Janette Coon RN  Outcome: Met This Shift  5/15/2019 0404 by Janette Coon RN  Outcome: Met This Shift  5/14/2019 1846 by Bob Kyle RN  Outcome: Met This Shift  5/14/2019 1416 by Delano Marcelo RN  Outcome: Met This Shift     Problem: Gas Exchange - Impaired:  Goal: Levels of oxygenation will improve  Description  Levels of oxygenation will improve  5/15/2019 0405 by Janette Coon RN  Outcome: Met This Shift  5/15/2019 0404 by Janette Coon RN  Outcome: Met This Shift  5/14/2019 1846 by Bob Kyle RN  Outcome: Met This Shift  5/14/2019 1416 by Delano Marcelo RN  Outcome: Met This Shift     Problem: Hyperthermia:  Goal: Ability to maintain a body temperature in the normal range will improve  Description  Ability to maintain a body temperature in the normal range will improve  5/15/2019 0405 by Janette Coon RN  Outcome: Met This Shift  5/15/2019 0404 by Janette Coon RN  Outcome: Met This Shift  5/14/2019 1846 by Bob Kyle RN  Outcome: Met This Shift  5/14/2019 1416 by Delano Marcelo RN  Outcome: Met This Shift     Problem: Tobacco Use:  Goal: Will participate in inpatient tobacco-use cessation counseling  Description  Will participate in inpatient tobacco-use cessation counseling  Outcome:  Met This Shift

## 2019-05-18 LAB
BLOOD CULTURE, ROUTINE: NORMAL
CULTURE, BLOOD 2: NORMAL

## 2020-11-03 PROBLEM — J18.9 RIGHT LOWER LOBE PNEUMONIA: Status: RESOLVED | Noted: 2019-05-14 | Resolved: 2020-11-03
